# Patient Record
Sex: MALE | NOT HISPANIC OR LATINO | Employment: FULL TIME | URBAN - METROPOLITAN AREA
[De-identification: names, ages, dates, MRNs, and addresses within clinical notes are randomized per-mention and may not be internally consistent; named-entity substitution may affect disease eponyms.]

---

## 2017-01-20 RX ORDER — LOSARTAN POTASSIUM AND HYDROCHLOROTHIAZIDE 12.5; 5 MG/1; MG/1
1 TABLET ORAL DAILY
Qty: 30 TABLET | Refills: 5 | Status: SHIPPED | OUTPATIENT
Start: 2017-01-20

## 2017-01-20 RX ORDER — DILTIAZEM HYDROCHLORIDE 240 MG/1
240 CAPSULE, EXTENDED RELEASE ORAL DAILY
Qty: 30 CAPSULE | Refills: 5 | Status: SHIPPED | OUTPATIENT
Start: 2017-01-20

## 2017-01-20 RX ORDER — WARFARIN SODIUM 3 MG/1
TABLET ORAL
Qty: 30 TABLET | Refills: 0 | Status: SHIPPED | OUTPATIENT
Start: 2017-01-20 | End: 2017-09-28 | Stop reason: DRUGHIGH

## 2017-01-24 ENCOUNTER — OFFICE VISIT (OUTPATIENT)
Dept: CARDIOLOGY | Facility: CLINIC | Age: 62
End: 2017-01-24

## 2017-01-24 VITALS
BODY MASS INDEX: 44.1 KG/M2 | HEART RATE: 95 BPM | SYSTOLIC BLOOD PRESSURE: 130 MMHG | HEIGHT: 71 IN | WEIGHT: 315 LBS | DIASTOLIC BLOOD PRESSURE: 78 MMHG

## 2017-01-24 DIAGNOSIS — I48.19 PERSISTENT ATRIAL FIBRILLATION (HCC): Primary | ICD-10-CM

## 2017-01-24 DIAGNOSIS — G47.33 OBSTRUCTIVE SLEEP APNEA SYNDROME: ICD-10-CM

## 2017-01-24 DIAGNOSIS — E66.01 MORBID OBESITY DUE TO EXCESS CALORIES (HCC): ICD-10-CM

## 2017-01-24 DIAGNOSIS — F10.10 ALCOHOL ABUSE: ICD-10-CM

## 2017-01-24 DIAGNOSIS — I10 ESSENTIAL HYPERTENSION: ICD-10-CM

## 2017-01-24 PROCEDURE — 99213 OFFICE O/P EST LOW 20 MIN: CPT | Performed by: INTERNAL MEDICINE

## 2017-01-24 NOTE — LETTER
January 24, 2017     Tk Kraft MD  [address] [place] [address] [place] [address]    Patient: Roman Cantor   YOB: 1955   Date of Visit: 1/24/2017       Dear Dr. Swapnil MD:    Thank you for referring Roman Cantor to me for evaluation. Below are the relevant portions of my assessment and plan of care.    If you have questions, please do not hesitate to call me. I look forward to following Roman along with you.         Sincerely,        Ad Jerome MD        CC: No Recipients  Ad Jerome MD  1/24/2017  1:51 PM  Sign at close encounter  PCP:  Dr. Tk Kraft  Referring MD:    Chief Complaint: Persitent afib    History of Present Illness:    The acute uncomplicated chief complaint first occurred in 2011,  is now perstent in nature, moderate in severity, random in occurrence, happening all the time, and has been medicated with sotalol and remotely amiodarone, and manifest as fatigue. It is not worsened with exertion and is not improved with rest.  The patient had attempted ECV with Dr. Jerome 12/16.  He went back out of [facility] prior to discharge home.  He stopped sotalol as he feels a lot better off of it.  He is trying to diet , exercise, and has yet to start his CPAP.    Problem   Persistent Atrial Fibrillation    Persistent Afib  A)Sotalol / Coumadin/CCD  B)Echo : normal EF  C)Recent ZIO patch  D)Prior remote ECV 2012 and Recent unsuccessful ECV with Dr. Shetty 9/26/16  E)Attempted ECV 12/16  F)Patient feels better off Sotalol            Current Outpatient Prescriptions:   •  diltiazem XR (DILACOR XR) 240 MG 24 hr capsule, Take 1 capsule by mouth Daily., Disp: 30 capsule, Rfl: 5  •  losartan-hydrochlorothiazide (HYZAAR) 50-12.5 MG per tablet, Take 1 tablet by mouth Daily., Disp: 30 tablet, Rfl: 5  •  naproxen sodium (ALEVE) 220 MG tablet, Take 220 mg by mouth daily., Disp: , Rfl:   •  sotalol (BETAPACE) 120 MG tablet, Take 1 tablet by mouth 2 (Two) Times a Day., Disp: 60  "tablet, Rfl: 0  •  warfarin (COUMADIN) 3 MG tablet, TAKE ONE TABLET BY MOUTH DAILY, Disp: 30 tablet, Rfl: 0   No Known Allergies     ROS:    Denies chest pain, tightness, palpitations, HART, PND, or edema    Visit Vitals   • /78 (BP Location: Left arm, Patient Position: Sitting)   • Pulse 95   • Ht 71\" (180.3 cm)   • Wt (!) 316 lb (143 kg)   • BMI 44.07 kg/m2        Physical Exam:    Lungs: CTA, no wheezing, equal air entry bilaterally, resonant to percussion  Cor: IRIR, physiologic S1, S2, no rubs, gallops, murmurs or thrills  Ext: warm, negative edema       Diagnosis Plan   1. Persistent atrial fibrillation unclear how much symptom provocation Will consider Tikosyn vs rate control   2. Essential hypertension     3. Obstructive sleep apnea syndrome     4. Alcohol abuse     5. Morbid obesity due to excess calories              Will Prateek carneyibe for Dr. Jerome  "

## 2017-01-24 NOTE — MR AVS SNAPSHOT
Roman Cantor   1/24/2017 1:00 PM   Office Visit    Dept Phone:  [phone]   Encounter #:  [id number]    Provider:  Ad Jerome MD   Department:  [facility] CARDIOLOGY                Your Full Care Plan              Today's Medication Changes          These changes are accurate as of: 1/24/17  1:55 PM.  If you have any questions, ask your nurse or doctor.               Stop taking medication(s)listed here:     sotalol 120 MG tablet   Commonly known as:  BETAPACE   Stopped by:  Ad Jerome MD                      Your Updated Medication List          This list is accurate as of: 1/24/17  1:55 PM.  Always use your most recent med list.                ALEVE 220 MG tablet   Generic drug:  naproxen sodium       diltiazem  MG 24 hr capsule   Commonly known as:  DILACOR XR   Take 1 capsule by mouth Daily.       losartan-hydrochlorothiazide 50-12.5 MG per tablet   Commonly known as:  HYZAAR   Take 1 tablet by mouth Daily.       warfarin 3 MG tablet   Commonly known as:  COUMADIN   TAKE ONE TABLET BY MOUTH DAILY               You Were Diagnosed With        Codes Comments    Persistent atrial fibrillation    -  Primary ICD-10-CM: I48.1  ICD-9-CM: 427.31     Essential hypertension     ICD-10-CM: I10  ICD-9-CM: 401.9     Obstructive sleep apnea syndrome     ICD-10-CM: G47.33  ICD-9-CM: 327.23     Alcohol abuse     ICD-10-CM: F10.10  ICD-9-CM: 305.00     Morbid obesity due to excess calories     ICD-10-CM: E66.01  ICD-9-CM: 278.01       Instructions     None    Patient Instructions History      Upcoming Appointments     Visit Type Date Time Department    FOLLOW UP 1/24/2017  1:00 PM [facility]    FOLLOW UP 2/10/2017  2:30 PM [facility] SLEEP [facility]IC[facility]    FOLLOW UP 7/25/2017  3:30 PM [facility]      MyChart Signup     Our records indicate that you have declined [facility] MyChart signup. If you would like to sign up for MyChart, please  "email [email or web address] or call [phone] to obtain an activation code.             Other Info from Your Visit           Your Appointments     Feb 10, 2017  2:30 PM EST   Follow Up with Sean Rubio MD   [facility] SLEEP MEDICINE (--)    [address]  [facility] [address] [phone]           Please bring completed new patient packets that were mailed to you prior to follow up as well as bringing a copy of insurnace card and photo ID to visit. Please bring downloadable chips/cards out of machines if you are on PAP therapy.            Jul 25, 2017  3:30 PM EDT   Follow Up with Ad Jerome MD   [facility] CARDIOLOGY (--)    [address]  [facility] [address] [phone]           Arrive 15 minutes prior to appointment.              Allergies     No Known Allergies      Vital Signs     Blood Pressure Pulse Height Weight Body Mass Index Smoking Status    130/78 (BP Location: Left arm, Patient Position: Sitting) 95 71\" (180.3 cm) 316 lb (143 kg) 44.07 kg/m2 Never Smoker      Problems and Diagnoses Noted     Alcohol abuse    High blood pressure    Obesity due to excess calories    Sleep apnea    Atrial fibrillation (irregular heartbeat)        "

## 2017-01-24 NOTE — PROGRESS NOTES
"PCP:  Dr. Tk Kraft  Referring MD:    Chief Complaint: Persitent afib    History of Present Illness:    The acute uncomplicated chief complaint first occurred in 2011,  is now perstent in nature, moderate in severity, random in occurrence, happening all the time, and has been medicated with sotalol and remotely amiodarone, and manifest as fatigue. It is not worsened with exertion and is not improved with rest.  The patient had attempted ECV with Dr. Jerome 12/16.  He went back out of [facility] prior to discharge home.  He stopped sotalol as he feels a lot better off of it.  He is trying to diet , exercise, and has yet to start his CPAP.    Problem   Persistent Atrial Fibrillation    Persistent Afib  A)Sotalol / Coumadin/CCD  B)Echo : normal EF  C)Recent ZIO patch  D)Prior remote ECV 2012 and Recent unsuccessful ECV with Dr. Shetty 9/26/16  E)Attempted ECV 12/16  F)Patient feels better off Sotalol            Current Outpatient Prescriptions:   •  diltiazem XR (DILACOR XR) 240 MG 24 hr capsule, Take 1 capsule by mouth Daily., Disp: 30 capsule, Rfl: 5  •  losartan-hydrochlorothiazide (HYZAAR) 50-12.5 MG per tablet, Take 1 tablet by mouth Daily., Disp: 30 tablet, Rfl: 5  •  naproxen sodium (ALEVE) 220 MG tablet, Take 220 mg by mouth daily., Disp: , Rfl:   •  sotalol (BETAPACE) 120 MG tablet, Take 1 tablet by mouth 2 (Two) Times a Day., Disp: 60 tablet, Rfl: 0  •  warfarin (COUMADIN) 3 MG tablet, TAKE ONE TABLET BY MOUTH DAILY, Disp: 30 tablet, Rfl: 0   No Known Allergies     ROS:    Denies chest pain, tightness, palpitations, HART, PND, or edema    Visit Vitals   • /78 (BP Location: Left arm, Patient Position: Sitting)   • Pulse 95   • Ht 71\" (180.3 cm)   • Wt (!) 316 lb (143 kg)   • BMI 44.07 kg/m2        Physical Exam:    Lungs: CTA, no wheezing, equal air entry bilaterally, resonant to percussion  Cor: IRIR, physiologic S1, S2, no rubs, gallops, murmurs or thrills  Ext: warm, negative edema       " Diagnosis Plan   1. Persistent atrial fibrillation unclear how much symptom provocation Will consider Tikosyn vs rate control   2. Essential hypertension     3. Obstructive sleep apnea syndrome     4. Alcohol abuse     5. Morbid obesity due to excess calories              Will Prateek carneyibe for Dr. Jerome

## 2017-03-20 RX ORDER — WARFARIN SODIUM 5 MG/1
TABLET ORAL
Qty: 30 TABLET | Refills: 0 | OUTPATIENT
Start: 2017-03-20

## 2017-03-27 RX ORDER — WARFARIN SODIUM 5 MG/1
TABLET ORAL
Qty: 30 TABLET | Refills: 0 | OUTPATIENT
Start: 2017-03-27

## 2017-09-18 ENCOUNTER — TRANSCRIBE ORDERS (OUTPATIENT)
Dept: ADMINISTRATIVE | Facility: HOSPITAL | Age: 62
End: 2017-09-18

## 2017-09-18 DIAGNOSIS — I65.23 CAROTID STENOSIS, BILATERAL: Primary | ICD-10-CM

## 2017-09-26 ENCOUNTER — HOSPITAL ENCOUNTER (OUTPATIENT)
Dept: CARDIOLOGY | Facility: HOSPITAL | Age: 62
Discharge: HOME | End: 2017-09-26
Admitting: INTERNAL MEDICINE

## 2017-09-26 DIAGNOSIS — I65.23 CAROTID STENOSIS, BILATERAL: ICD-10-CM

## 2017-09-26 LAB
BH CV XLRA MEAS LEFT CCA RATIO VEL: 68.4 CM/SEC
BH CV XLRA MEAS LEFT DIST CCA EDV: 25.1 CM/SEC
BH CV XLRA MEAS LEFT DIST CCA PSV: 68.4 CM/SEC
BH CV XLRA MEAS LEFT DIST ICA EDV: 21.2 CM/SEC
BH CV XLRA MEAS LEFT DIST ICA PSV: 53.8 CM/SEC
BH CV XLRA MEAS LEFT ICA RATIO VEL: 55 CM/SEC
BH CV XLRA MEAS LEFT ICA/CCA RATIO: 0.8
BH CV XLRA MEAS LEFT MID ICA EDV: 24 CM/SEC
BH CV XLRA MEAS LEFT MID ICA PSV: 55 CM/SEC
BH CV XLRA MEAS LEFT PROX CCA EDV: 25.9 CM/SEC
BH CV XLRA MEAS LEFT PROX CCA PSV: 76.2 CM/SEC
BH CV XLRA MEAS LEFT PROX ECA PSV: 84.1 CM/SEC
BH CV XLRA MEAS LEFT PROX ICA EDV: 18.1 CM/SEC
BH CV XLRA MEAS LEFT PROX ICA PSV: 42.8 CM/SEC
BH CV XLRA MEAS LEFT PROX SCLA PSV: 94.3 CM/SEC
BH CV XLRA MEAS LEFT VERTEBRAL A EDV: 9.4 CM/SEC
BH CV XLRA MEAS LEFT VERTEBRAL A PSV: 25.5 CM/SEC
BH CV XLRA MEAS RIGHT CCA RATIO VEL: 37.6 CM/SEC
BH CV XLRA MEAS RIGHT DIST CCA EDV: 15.2 CM/SEC
BH CV XLRA MEAS RIGHT DIST CCA PSV: 37.6 CM/SEC
BH CV XLRA MEAS RIGHT DIST ICA EDV: 18.3 CM/SEC
BH CV XLRA MEAS RIGHT DIST ICA PSV: 37.6 CM/SEC
BH CV XLRA MEAS RIGHT ICA RATIO VEL: 39.7 CM/SEC
BH CV XLRA MEAS RIGHT ICA/CCA RATIO: 1.1
BH CV XLRA MEAS RIGHT MID ICA EDV: 16.1 CM/SEC
BH CV XLRA MEAS RIGHT MID ICA PSV: 38.5 CM/SEC
BH CV XLRA MEAS RIGHT PROX CCA EDV: 14.5 CM/SEC
BH CV XLRA MEAS RIGHT PROX CCA PSV: 47.6 CM/SEC
BH CV XLRA MEAS RIGHT PROX ECA PSV: 131 CM/SEC
BH CV XLRA MEAS RIGHT PROX ICA EDV: 13.4 CM/SEC
BH CV XLRA MEAS RIGHT PROX ICA PSV: 39.7 CM/SEC
BH CV XLRA MEAS RIGHT PROX SCLA PSV: 45.7 CM/SEC

## 2017-09-26 PROCEDURE — 93880 EXTRACRANIAL BILAT STUDY: CPT | Performed by: INTERNAL MEDICINE

## 2017-09-26 PROCEDURE — 93880 EXTRACRANIAL BILAT STUDY: CPT

## 2017-09-28 ENCOUNTER — OFFICE VISIT (OUTPATIENT)
Dept: NEUROSURGERY | Facility: CLINIC | Age: 62
End: 2017-09-28

## 2017-09-28 VITALS — BODY MASS INDEX: 43.4 KG/M2 | WEIGHT: 310 LBS | HEIGHT: 71 IN

## 2017-09-28 DIAGNOSIS — M43.17 SPONDYLOLISTHESIS AT L5-S1 LEVEL: Primary | ICD-10-CM

## 2017-09-28 DIAGNOSIS — M51.36 DDD (DEGENERATIVE DISC DISEASE), LUMBAR: ICD-10-CM

## 2017-09-28 PROCEDURE — 99243 OFF/OP CNSLTJ NEW/EST LOW 30: CPT | Performed by: NEUROLOGICAL SURGERY

## 2017-09-28 RX ORDER — WARFARIN SODIUM 5 MG/1
TABLET ORAL
COMMUNITY
Start: 2017-09-15

## 2017-09-28 RX ORDER — SULFAMETHOXAZOLE AND TRIMETHOPRIM 800; 160 MG/1; MG/1
TABLET ORAL
COMMUNITY
Start: 2017-09-26

## 2017-09-28 RX ORDER — WARFARIN SODIUM 4 MG/1
TABLET ORAL
COMMUNITY
Start: 2017-09-15

## 2017-09-28 NOTE — PROGRESS NOTES
Subjective   Patient ID: Roman Cantor is a 62 y.o. male is being seen for consultation today at the request of Tk Kraft MD  Chief Complaint: Right hip and leg pain    History of Present Illness: The patient is a 62-year-old man with a history of pain in his lower back that radiates to the right hip and leg, with numbness radiating to the right foot.  These symptoms have been fairly intense for the past 4 weeks, although he has had some intermittent back problems for years.  It is worse if he is lying down or standing still.  He works in production management.  He has had no physical therapy done yet.    He has a chronic right ankle problem from prior sporting days, but cannot have surgery on his right ankle because of a chronic right poorly healing skin ulcer with chronic edema.  He has atrial fibrillation and takes Coumadin.    Review of Radiographic Studies:  Lumbar MRI scan shows spondylolisthesis L5-S1 with bilateral foraminal stenosis, which is apparently symptomatic only on the right side, although it looks equally symptomatic or at least stenotic on both sides.  Her degenerative disc changes also at L2-3, L3-4, and L4-5.    The following portions of the patient's history were reviewed, updated as appropriate and approved: allergies, current medications, past family history, past medical history, past social history, past surgical history, review of systems and problem list.  Review of Systems   Constitutional: Positive for fatigue. Negative for activity change, appetite change, chills, diaphoresis, fever and unexpected weight change.   HENT: Negative for congestion, dental problem, drooling, ear discharge, ear pain, facial swelling, hearing loss, mouth sores, nosebleeds, postnasal drip, rhinorrhea, sinus pressure, sneezing, sore throat, tinnitus, trouble swallowing and voice change.    Eyes: Negative for photophobia, pain, discharge, redness, itching and visual disturbance.   Respiratory: Negative  for apnea, cough, choking, chest tightness, shortness of breath, wheezing and stridor.    Cardiovascular: Positive for palpitations. Negative for chest pain and leg swelling.   Gastrointestinal: Negative for abdominal distention, abdominal pain, anal bleeding, blood in stool, constipation, diarrhea, nausea, rectal pain and vomiting.   Endocrine: Negative for cold intolerance, heat intolerance, polydipsia, polyphagia and polyuria.   Genitourinary: Negative for decreased urine volume, difficulty urinating, dysuria, enuresis, flank pain, frequency, genital sores, hematuria and urgency.   Musculoskeletal: Positive for back pain. Negative for arthralgias, gait problem, joint swelling, myalgias, neck pain and neck stiffness.   Skin: Negative for color change, pallor, rash and wound.   Allergic/Immunologic: Negative for environmental allergies, food allergies and immunocompromised state.   Neurological: Negative for dizziness, tremors, seizures, syncope, facial asymmetry, speech difficulty, weakness, light-headedness, numbness and headaches.   Hematological: Negative for adenopathy. Does not bruise/bleed easily.   Psychiatric/Behavioral: Negative for agitation, behavioral problems, confusion, decreased concentration, dysphoric mood, hallucinations, self-injury, sleep disturbance and suicidal ideas. The patient is not nervous/anxious and is not hyperactive.        Objective     NEUROLOGICAL EXAMINATION:      MENTAL STATUS:  Alert and oriented.  Speech intact.  Recent and remote memory intact.      CRANIAL NERVES:  Cranial nerve II:  Visual fields are full to confrontation.  Cranial nerves III, IV and VI:  PERRLADC.  Extraocular movements are intact.  Nystagmus is not present.  Cranial nerve V:  Facial sensation is intact to light touch.  Cranial nerve VII:  Muscles of facial expression reveal no asymmetry.  Cranial nerve VIII:  Hearing is intact to finger rub bilaterally.  Cranial nerves IX and X:  Palate elevates  symmetrically.  Cranial nerve XI:  Shoulder shrug is intact.  Cranial nerve XII:  Tongue is midline without evidence of atrophy or fasciculation.    MUSCULOSKELETAL:  SLR negative.  Bilateral chronic edema of both pretibial regions.    MOTOR:  Intact dorsiflexion and plantar flexion bilaterally.    SENSATION: No focal sensory loss.    REFLEXES:  DTR reduced ankle jerk on the right and reduced knee jerk on the left.    Assessment   Spondylolisthesis L5-S1, right sciatic pain probably related to foraminal stenosis L5-S1 right.       Plan   Physical therapy for 4 weeks and then follow-up.  If the sciatic pain is not improved he would be a surgical candidate, but in his case it would be advisable to limited to simple foraminal decompression with discectomy rather than full correction of the spondylolisthesis with fusion.       Roman Augustine MD

## 2018-02-13 ENCOUNTER — TELEPHONE (OUTPATIENT)
Dept: NEUROSURGERY | Facility: CLINIC | Age: 63
End: 2018-02-13

## 2018-02-13 DIAGNOSIS — M51.36 DDD (DEGENERATIVE DISC DISEASE), LUMBAR: ICD-10-CM

## 2018-02-13 DIAGNOSIS — M43.10 ACQUIRED SPONDYLOLISTHESIS: Primary | ICD-10-CM

## 2018-02-13 NOTE — TELEPHONE ENCOUNTER
Provider: Davide  Caller: Roman  Time of call:   1029am  Phone #:  [phone]  Last visit:   09/28/17  Next visit: not scheduled yet    Reason for call:       Pt has cancelled last 3 appts. (Which he acknowledged)  He states he is in severe pain, but has NOT went to PT due to his work.     He wants to know if he can come in and see  or if he needs to do PT first.   He said he doesn't want to waste his time, or .     --Routing to [facility] since she works more with Davide, to see if pt should try PT first, then f/u.

## 2018-02-13 NOTE — TELEPHONE ENCOUNTER
Thanks Kush!    -Spoke with pt, he is aware to try PT before seeing .     Pt requested another PT order be mailed to him.   Order in mailbox.

## 2018-02-23 NOTE — TELEPHONE ENCOUNTER
Pt has called in this morning stating that the PT is not of much benefit, would like to schedule follow up with falk to discuss further treatment options. Ok to schedule?

## 2018-02-23 NOTE — TELEPHONE ENCOUNTER
Called pt, went to voicemail, left detailed message asking him to give PT a little bit longer and try a few more sessions and to call back and ask to speak to Sima to schedule f/u.

## 2018-03-22 ENCOUNTER — OFFICE VISIT (OUTPATIENT)
Dept: NEUROSURGERY | Facility: CLINIC | Age: 63
End: 2018-03-22

## 2018-03-22 VITALS — HEIGHT: 71 IN | BODY MASS INDEX: 44.1 KG/M2 | WEIGHT: 315 LBS | TEMPERATURE: 98 F

## 2018-03-22 DIAGNOSIS — M48.061 FORAMINAL STENOSIS OF LUMBAR REGION: ICD-10-CM

## 2018-03-22 DIAGNOSIS — M43.17 SPONDYLOLISTHESIS AT L5-S1 LEVEL: Primary | ICD-10-CM

## 2018-03-22 DIAGNOSIS — M54.17 LUMBOSACRAL RADICULOPATHY AT L5: ICD-10-CM

## 2018-03-22 PROCEDURE — 99213 OFFICE O/P EST LOW 20 MIN: CPT | Performed by: NEUROLOGICAL SURGERY

## 2018-03-22 RX ORDER — DILTIAZEM HYDROCHLORIDE 240 MG/1
CAPSULE, EXTENDED RELEASE ORAL
COMMUNITY
Start: 2018-02-18

## 2018-03-22 NOTE — PATIENT INSTRUCTIONS
Spinal Fusion  Spinal fusion is a procedure to make two or more of the bones in your spine (vertebrae) grow together (fuse). This procedure stops movement between the bones of your spine. This can help:  · Lessen pain.  · Prevent your spine from getting weaker or changing shape.  Bone material (graft) will be put in between the two bones of your spine to help them grow together.  What happens before the procedure?  Staying hydrated   Follow instructions from your doctor about hydration, which may include:  · Up to 2 hours before the procedure - you may continue to drink clear liquids, such as:  ¨ Water.  ¨ Clear fruit juice.  ¨ Black coffee.  ¨ Plain tea.  Eating and drinking restrictions   Follow instructions from your health care provider about eating and drinking, which may include:  · 8 hours before the procedure - stop eating heavy meals or foods such as meat, fried foods, or fatty foods.  · 6 hours before the procedure - stop eating light meals or foods, such as toast or cereal.  · 6 hours before the procedure - stop drinking milk or drinks that contain milk.  · 2 hours before the procedure - stop drinking clear liquids.  Medicines   · Ask your doctor about:  ¨ Changing or stopping your regular medicines. This is important if you take diabetes medicines or blood thinners.  ¨ Taking medicines such as aspirin and ibuprofen. These medicines can thin your blood. Do not take these medicines before your procedure if your doctor tells you not to.  · You may be given antibiotic medicine to help prevent infection.  General instructions   · Ask your doctor how your surgical site will be marked.  · You will have blood and pee (urine) samples taken.  · You may also have tests, such as:  ¨ X-rays.  ¨ CT scan.  ¨ MRI.  · Plan to have someone take you home from the hospital or clinic.  · If you go home right after the procedure, plan to have someone with you for 24 hours.  What happens during the procedure?  · To reduce your  risk of infection:  ¨ Your health care team will wash or sanitize their hands.  ¨ Your skin will be washed with soap.  ¨ Hair may be removed from the surgical area.  · An IV tube will be put into one of your veins.  · You will be given one or more of the following:  ¨ A medicine to help you relax (sedative).  ¨ A medicine to make you fall asleep (general anesthetic).  · If bone from another part of your body is being used to fill the space between the bones of your spine:  ¨ A surgical cut (incision) will be made over the site of the bone graft.  ¨ A small part of the bone will be taken out.  · A surgical cut will be made over the bones of your spine that your surgeon is going to work on. This cut may be in one of these areas:  ¨ Your back.  ¨ Your belly (abdomen).  ¨ Your side.  · The back muscles will be moved aside so the surgeon can see the bones of your spine.  · If you are having this procedure to treat a disk in your spine that bulges out too far (herniated disk), part of the disk will be taken out.  · The space between the bones of your spine will be filled with one of the following:  ¨ Bone from another part of your body.  ¨ Bone from a bone donor.  ¨ Bone that is not real (is artificial).  · Your back muscles will be moved back into place.  · Screws and rods or metal plates may be used to keep the bones of your spine in place while they grow together.  · Your surgical cuts may be closed.  · A bandage (dressing) may be used to cover your surgical cuts.  The procedure may vary among doctors and hospitals.  What happens after the procedure?  · You will be given medicine for pain if you need it.  · You will continue to get fluids and medicines through an IV tube.  · Your blood pressure, heart rate, breathing rate, and blood oxygen level will be checked until the medicines you were given have worn off.  · Do not drive for 24 hours if you were given a medicine to help you relax.  · You may have to wear  "compression stockings. These stockings help to prevent blood clots and reduce swelling in your legs.  · You will be shown how to move the right way and how to stand and walk. While in bed, you will be told to turn often by \"log rolling.\" This is when you move your whole body without twisting your back.  This information is not intended to replace advice given to you by your health care provider. Make sure you discuss any questions you have with your health care provider.  Document Released: 04/12/2012 Document Revised: 08/15/2017 Document Reviewed: 06/01/2016  [employer] Interactive Patient Education © 2017 [employer]    Spinal Fusion, Care After  These instructions give you information about caring for yourself after your procedure. Your doctor may also give you more specific instructions. Call your doctor if you have any problems or questions after your procedure.  Follow these instructions at home:  Medicines   · Take over-the-counter and prescription medicines only as told by your doctor. These include any medicines for pain.  · Do not drive for 24 hours if you received a sedative.  · Do not drive or use heavy machinery while taking prescription pain medicine.  · If you were prescribed an antibiotic medicine, take it as told by your doctor. Do not stop taking the antibiotic even if you start to feel better.  Surgical Cut (Incision) Care   · Follow instructions from your doctor about how to take care of your surgical cut. Make sure you:  ¨ Wash your hands with soap and water before you change your bandage (dressing). If you cannot use soap and water, use hand .  ¨ Change your bandage as told by your doctor.  ¨ Leave stitches (sutures), skin glue, or skin tape (adhesive) strips in place. They may need to stay in place for 2 weeks or longer. If tape strips get loose and curl up, you may trim the loose edges. Do not remove tape strips completely unless your doctor says it is okay.  · Keep your surgical cut " clean and dry. Do not take baths, swim, or use a hot tub until your doctor says it is okay.  · Check your surgical cut and the area around it every day for:  ¨ Redness.  ¨ Swelling.  ¨ Fluid.  Physical Activity   · Return to your normal activities as told by your doctor. Ask your doctor what activities are safe for you. Rest and protect your back as much as you can.  · Follow instructions from your doctor about how to move. Use good posture to help your spine heal.  · Do not lift anything that is heavier than 8 lb (3.6 kg) or as told by your doctor until he or she says that it is safe. Do not lift anything over your head.  · Do not twist or bend at the waist until your doctor says it is okay.  · Avoid pushing or pulling motions.  · Do not sit or lie down in the same position for long periods of time.  · Do not start to exercise until your doctor says it is okay. Ask your doctor what kinds of exercise you can do to make your back stronger.  General instructions   · If you were given a brace, use it as told by your doctor.  · Wear compression stockings as told by your doctor.  · Do not use tobacco products. These include cigarettes, chewing tobacco, or e-cigarettes. If you need help quitting, ask your doctor.  · Keep all follow-up visits as told by your doctor. This is important. This includes any visits with your physical therapist, if this applies.  Contact a doctor if:  · Your pain gets worse.  · Your medicine does not help your pain.  · Your legs or feet become painful or swollen.  · Your surgical cut is red, swollen, or painful.  · You have fluid, blood, or pus coming from your surgical cut.  · You feel sick to your stomach (nauseous).  · You throw up (vomit).  · Your have weakness or loss of feeling (numbness) in your legs that is new or getting worse.  · You have a fever.  · You have trouble controlling when you pee (urinate) or poop (have a bowel movement).  Get help right away if:  · Your pain is very  bad.  · You have chest pain.  · You have trouble breathing.  · You start to have a cough.  These symptoms may be an emergency. Do not wait to see if the symptoms will go away. Get medical help right away. Call your local emergency services (911 in the [place] Do not drive yourself to the hospital.  This information is not intended to replace advice given to you by your health care provider. Make sure you discuss any questions you have with your health care provider.  Document Released: 04/12/2012 Document Revised: 08/15/2017 Document Reviewed: 06/01/2016  [employer] Interactive Patient Education © 2017 [employer]      If you have any questions in regards to your visit with  today, please do not hesitate to contact our office. Ask to speak with a [facility] for any clinical questions you may have.    Luzma Multani, [facility]    [phone]

## 2018-03-22 NOTE — PROGRESS NOTES
Subjective   Roman Cantor is a 62 y.o. male who presents for follow up of  right hip and leg pain.     Mr. 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  He has had intermittent back problems for years.    Lumbar MRI scan shows spondylolisthesis L5-S1 with a far lateral herniated disc on the right filling the foramen extending far laterally.  He had physical therapy with multiple visits over the past month with no improvement.    He has atrial fibrillation uses Coumadin.  He's had a chronic right ankle problems sporting days.  He has a joint replacement in his right first toe with numbness of the right toe.    The following portions of the patient's history were reviewed, updated as appropriate and approved: allergies, current medications, past family history, past medical history, past social history, past surgical history, review of systems and problem list.     Review of Systems   Cardiovascular: Positive for palpitations.   Musculoskeletal: Positive for back pain.   All other systems reviewed and are negative.      Objective    NEUROLOGICAL EXAMINATION:    No dorsiflexion or plantarflexion weakness in the right foot.    Assessment   L5-S1 spondylolisthesis, right foraminal stenosis due to herniated disc in the foramen and extraforaminal.       Plan   We discussed the surgical treatment of this today, which would be PLIF with pedicle screw fixation in order to gain access to the far lateral disc.  With a preexistent spondylolisthesis and a need for complete facetectomy fusion would be necessary.  Should he decide the pain is sufficient in degree to warrant this degree of major surgery he will let us know and surgery will be arranged.       Roman Augustine MD

## 2018-03-28 ENCOUNTER — TELEPHONE (OUTPATIENT)
Dept: NEUROSURGERY | Facility: CLINIC | Age: 63
End: 2018-03-28

## 2018-03-28 NOTE — TELEPHONE ENCOUNTER
Provider:  Davide  Caller: Roman  Time of call:     Phone #:  [phone]  Last visit:  03/22/2018   Next visit: prn    Reason for call:       Pt called wanting to discuss surgery.    He wants to push surgery as long as he can, around September/October 2018    Routing message to Russel.